# Patient Record
Sex: FEMALE | Race: WHITE | NOT HISPANIC OR LATINO | URBAN - METROPOLITAN AREA
[De-identification: names, ages, dates, MRNs, and addresses within clinical notes are randomized per-mention and may not be internally consistent; named-entity substitution may affect disease eponyms.]

---

## 2019-02-19 ENCOUNTER — HOSPITAL ENCOUNTER (OUTPATIENT)
Dept: URGENT CARE | Facility: CLINIC | Age: 34
Discharge: HOME | End: 2019-02-19

## 2024-10-22 NOTE — PROGRESS NOTES
"Well Woman Visit    CC: Scheduled annual well gyn visit  Chief Complaint   Patient presents with    Annual Exam     Discuss menopause symptoms         S/p LSO and RTO    Last Completed Pap Smear       This patient has no relevant Health Maintenance data.           Last Completed Mammogram       This patient has no relevant Health Maintenance data.             HPI:   39 y.o. s/p endometrial ablation with mostly amenorrhea  PCP: does manage PMHx and preventative labs  History: PMHx, Meds, Allergies, PSHx, Social Hx, and POBHx all reviewed and updated.    Pt has no complaints today. Pt states she has been having significant hot flashes and night sweats for the past year..  She briefly spoke to her last gynecologist a year ago and was told she was too young for menopausal symptoms.  Patient has previously had an endometrial ablation and no longer has any bleeding.  She is also noticing a significant increase in anxiety almost to the point of panic.  She states that symptoms feels like her chest is tight.  She is noticing joint pain and brain fog and difficulty sleeping as well.  She is also having significantly increased decreased libido    PHYSICAL EXAM:  /88   Pulse 69   Ht 165.1 cm (65\")   Wt 73.5 kg (162 lb)   Breastfeeding No   BMI 26.96 kg/m²  Not found.  General- NAD, alert and oriented, appropriate  Psych- Normal mood, good memory  Neck- No masses, no thyroid enlargement  CV- Regular rhythm, no murnurs  Resp- CTA to bases, no wheezes  Abdomen- Soft, non distended, non tender, no masses  Breast left-  Bilaterally symmetrical, no masses, non tender, no nipple discharge  Breast right- Bilaterally symmetrical, no masses, non tender, no nipple discharge  External genitalia- Normal female, no lesions  Urethra/meatus- Normal, no masses, non tender  Bladder- Normal, no masses, non tender  Vagina- Normal, no atrophy, no lesions, no discharge.    Cvx- Normal, no lesions, no discharge, No cervical motion " tenderness  Uterus- Normal size, shape & consistency.  Non tender, mobile.  Adnexa- No mass, non tender  Anus/Rectum/Perineum- Not performed  Lymphatic- No palpable neck, axillary, or groin nodes  Ext- No edema, no cyanosis    Skin- No lesions, no rashes, no acanthosis nigricans      ASSESSMENT and PLAN:    Diagnoses and all orders for this visit:    1. Well woman exam with routine gynecological exam (Primary)  -     IgP, Aptima HPV    2. Perimenopause  Assessment & Plan:  Patient was counseled at length regarding perimenopause as a clinical diagnosis.  Patient was counseled regarding the multiple symptoms that can be secondary to perimenopause.  Her increasing anxiety could be part of perimenopause or it could be worsening anxiety with situational stressors such as work and school and family life.  We reviewed the menopause Society position statement from 2022 regarding the use of MHT.  We discussed the use of OCPs early in the perimenopausal transition for the treatment of symptoms.  However the patient is complaining of decreased libido and is hesitant to try another medication that can decrease libido further.  Patient was counseled regarding cyclic Prometrium or daily Prometrium or transitioning to traditional MHT.    Orders:  -     estradiol (Minivelle) 0.05 MG/24HR patch; Place 1 patch on the skin as directed by provider 2 (Two) Times a Week.  Dispense: 24 patch; Refill: 3  -     Progesterone (Prometrium) 100 MG capsule; Take 1 capsule by mouth Daily.  Dispense: 90 capsule; Refill: 3    3. Anxiety  Comments:  Patient has been on her current dose of Lexapro for several years.  She does not have a PCP and wishes to continue  Orders:  -     escitalopram (LEXAPRO) 10 MG tablet; Take 1 tablet by mouth Daily.  Dispense: 90 tablet; Refill: 3    4. H/O cold sores  Comments:  On prophylactic daily Valtrex  Orders:  -     valACYclovir (VALTREX) 500 MG tablet; Take 1 tablet by mouth Daily.  Dispense: 90 tablet; Refill:  3    5. Encounter for screening mammogram for malignant neoplasm of breast  -     Mammo Screening Digital Tomosynthesis Bilateral With CAD; Future        Preventative:  BREAST HEALTH- Monthly self breast exam importance and how to reviewed. MMG and/or MRI (prn) reviewed per society guidelines and her individual history. Screen: Pt will call to schedule  CERVICAL CANCER Screening- Reviewed current ASCCP guidelines for screening w and wo cotest HPV, age specific.  Screen: Updated today  Follow up PCP/Specialist PMHx and/or Labs    HRT/ERT- newer (as of 2024) evidence, along with an in-depth review of [facility] results, supports the use of iso-molecular (identical to human) estradiol for treatment of vasomotor symptoms and osteoporosis prevention without increasing risk of cardiovascular disease.  Starting within 10years of menopause and before 59yo is ideal. For patients who have not had a hysterectomy, the addition of iso-molecular micronized progesterone further enhances bone mineral density without an increase in breast cancer incidence or mortality.  Atherosclerotic cardiovascular disease (ASCVD) risk calculated today not calculated today.  Typically if ASCVD risk is 19% or more, HRT is not recommended.   She understands the importance of having any ordered tests to be performed in a timely fashion.  The risks of not performing them include, but are not limited to, advanced cancer stages, bone loss from osteoporosis and/or subsequent increase in morbidity and/or mortality.  She is encouraged to review her results online and/or contact or office if she has questions.     Follow Up:  Return in about 3 months (around 1/23/2025).    I spent 45 minutes on the separately reported service of perimenopause. This time is not included in the time used to support the E/M service also reported today.        Faye Albrecht MD  10/23/2024    [facility]  [address] [place]  [place] [address]  Dept: [phone]  Dept Fax: [phone]  Loc: [phone]  Loc Fax: [phone]

## 2024-10-23 ENCOUNTER — OFFICE VISIT (OUTPATIENT)
Dept: OBSTETRICS AND GYNECOLOGY | Facility: CLINIC | Age: 39
End: 2024-10-23
Payer: COMMERCIAL

## 2024-10-23 VITALS
DIASTOLIC BLOOD PRESSURE: 88 MMHG | SYSTOLIC BLOOD PRESSURE: 122 MMHG | HEIGHT: 65 IN | HEART RATE: 69 BPM | BODY MASS INDEX: 26.99 KG/M2 | WEIGHT: 162 LBS

## 2024-10-23 DIAGNOSIS — Z86.19 H/O COLD SORES: ICD-10-CM

## 2024-10-23 DIAGNOSIS — Z12.31 ENCOUNTER FOR SCREENING MAMMOGRAM FOR MALIGNANT NEOPLASM OF BREAST: ICD-10-CM

## 2024-10-23 DIAGNOSIS — N95.1 PERIMENOPAUSE: ICD-10-CM

## 2024-10-23 DIAGNOSIS — Z01.419 WELL WOMAN EXAM WITH ROUTINE GYNECOLOGICAL EXAM: Primary | ICD-10-CM

## 2024-10-23 DIAGNOSIS — F41.9 ANXIETY: ICD-10-CM

## 2024-10-23 PROCEDURE — 87624 HPV HI-RISK TYP POOLED RSLT: CPT | Performed by: OBSTETRICS & GYNECOLOGY

## 2024-10-23 PROCEDURE — G0123 SCREEN CERV/VAG THIN LAYER: HCPCS | Performed by: OBSTETRICS & GYNECOLOGY

## 2024-10-23 RX ORDER — ESTRADIOL 0.05 MG/D
1 PATCH, EXTENDED RELEASE TRANSDERMAL 2 TIMES WEEKLY
Qty: 24 PATCH | Refills: 3 | Status: SHIPPED | OUTPATIENT
Start: 2024-10-24

## 2024-10-23 RX ORDER — ESCITALOPRAM OXALATE 10 MG/1
10 TABLET ORAL DAILY
COMMUNITY
End: 2024-10-23 | Stop reason: SDUPTHER

## 2024-10-23 RX ORDER — FEXOFENADINE HCL 180 MG/1
180 TABLET ORAL DAILY
COMMUNITY

## 2024-10-23 RX ORDER — ESCITALOPRAM OXALATE 10 MG/1
10 TABLET ORAL DAILY
Qty: 90 TABLET | Refills: 3 | Status: SHIPPED | OUTPATIENT
Start: 2024-10-23 | End: 2024-10-25 | Stop reason: DRUGHIGH

## 2024-10-23 RX ORDER — VALACYCLOVIR HYDROCHLORIDE 500 MG/1
500 TABLET, FILM COATED ORAL DAILY
Qty: 90 TABLET | Refills: 3 | Status: SHIPPED | OUTPATIENT
Start: 2024-10-23

## 2024-10-23 RX ORDER — PROGESTERONE 100 MG/1
100 CAPSULE ORAL DAILY
Qty: 90 CAPSULE | Refills: 3 | Status: SHIPPED | OUTPATIENT
Start: 2024-10-23

## 2024-10-23 RX ORDER — VALACYCLOVIR HYDROCHLORIDE 500 MG/1
500 TABLET, FILM COATED ORAL 2 TIMES DAILY
COMMUNITY
End: 2024-10-23 | Stop reason: SDUPTHER

## 2024-10-23 NOTE — PATIENT INSTRUCTIONS
You are Not Broken by Luisa Swartz MD  The New Menopause by Erica Nowak MD  Come as You Are by Ngozi Yan, PhD    [employer] personal lubricant       The Menopause Society Position Statement 2022 Position Statement on MHT

## 2024-10-24 PROBLEM — N95.1 PERIMENOPAUSE: Status: ACTIVE | Noted: 2024-10-24

## 2024-10-24 NOTE — ASSESSMENT & PLAN NOTE
Patient was counseled at length regarding perimenopause as a clinical diagnosis.  Patient was counseled regarding the multiple symptoms that can be secondary to perimenopause.  Her increasing anxiety could be part of perimenopause or it could be worsening anxiety with situational stressors such as work and school and family life.  We reviewed the menopause Society position statement from 2022 regarding the use of MHT.  We discussed the use of OCPs early in the perimenopausal transition for the treatment of symptoms.  However the patient is complaining of decreased libido and is hesitant to try another medication that can decrease libido further.  Patient was counseled regarding cyclic Prometrium or daily Prometrium or transitioning to traditional MHT.

## 2024-10-29 LAB
CYTOLOGIST CVX/VAG CYTO: NORMAL
CYTOLOGY CVX/VAG DOC CYTO: NORMAL
CYTOLOGY CVX/VAG DOC THIN PREP: NORMAL
DX ICD CODE: NORMAL
HPV I/H RISK 4 DNA CVX QL PROBE+SIG AMP: NEGATIVE
Lab: NORMAL
OTHER STN SPEC: NORMAL
STAT OF ADQ CVX/VAG CYTO-IMP: NORMAL

## 2025-02-03 NOTE — PROGRESS NOTES
"GYN Visit    CC: Perimenopause    HPI:   39 y.o. Contraception or HRT: using HRTs/p LSO and RTO    History of Present Illness  The patient presents for evaluation of perimenopause.    She was initiated on estrogen and progesterone therapy for perimenopausal symptoms, which significantly improved her condition within the first few weeks. She reported a cessation of night sweats, reduced anxiety, increased energy levels, and an overall improvement in mood. However, over the past 3 weeks, she has experienced a resurgence of night sweats and a decline in energy levels. She reports no new life stressors or changes in her routine. The night sweats are not constant but have increased in frequency. She is uncertain about the use of a generic patch but notes that her symptoms tend to worsen the night before she changes her patch. She prefers the convenience of the patch and is curious about the implications of increasing the dosage. Her sleep quality has improved, and she has not attempted to take progesterone during the day. She is diligent about taking her medication before bed and notices a difference if she forgets. Her  and children have observed positive changes in her demeanor. She has lost approximately 5 pounds and no longer experiences bloating. She feels ready to resume gym activities. Her appetite has normalized, and she no longer craves chocolate. She reports no issues with painful intercourse or libido. She has noticed some spotting, which is not severe. She can predict her menstrual cycle based on changes in her bowel movements. She underwent an annual exam in October.    MEDICATIONS  Current: Estrogen, progesterone    History: PMHx, Meds, Allergies, PSHx, Social Hx, and POBHx all reviewed and updated.    PHYSICAL EXAM:  /71   Pulse 73   Ht 165.1 cm (65\")   Wt 71.2 kg (157 lb)   Breastfeeding No   BMI 26.13 kg/m²   General- NAD, alert and oriented, appropriate  Psych- Normal mood, good " memory      ASSESSMENT AND PLAN:  Diagnoses and all orders for this visit:    1. Perimenopause (Primary)  -     estradiol (Minivelle) 0.075 MG/24HR patch; Place 1 patch on the skin as directed by provider 2 (Two) Times a Week.  Dispense: 24 patch; Refill: 3        Assessment & Plan  1. Perimenopause.  She reports initial improvement in symptoms such as night sweats, anxiety, and energy levels after starting estrogen and progesterone therapy. However, over the past 3 weeks, night sweats and decreased energy levels have returned. She is currently using a 0.05 mg estrogen patch. The dosage of the estrogen patch will be increased to 0.075 mg. She is advised to cut the current patches in half and use 1.5 patches until the current supply is exhausted. The progesterone dosage will remain unchanged. She is also advised that spotting should resolve after the first 6 months of treatment. If symptoms persist, further adjustments will be considered.    Follow-up  The patient will follow up in October 2025.         Follow Up:  Return for Annual physical.          Faye Albrecht MD  02/04/2025    Patient or patient representative verbalized consent for the use of Ambient Listening during the visit with  Faye Albrecht MD for chart documentation. 2/4/2025  12:15 EST

## 2025-02-04 ENCOUNTER — OFFICE VISIT (OUTPATIENT)
Dept: OBSTETRICS AND GYNECOLOGY | Facility: CLINIC | Age: 40
End: 2025-02-04
Payer: COMMERCIAL

## 2025-02-04 VITALS
DIASTOLIC BLOOD PRESSURE: 71 MMHG | HEIGHT: 65 IN | SYSTOLIC BLOOD PRESSURE: 104 MMHG | HEART RATE: 73 BPM | BODY MASS INDEX: 26.16 KG/M2 | WEIGHT: 157 LBS

## 2025-02-04 DIAGNOSIS — N95.1 PERIMENOPAUSE: Primary | ICD-10-CM

## 2025-02-04 RX ORDER — ESTRADIOL 0.07 MG/D
1 FILM, EXTENDED RELEASE TRANSDERMAL 2 TIMES WEEKLY
Qty: 24 PATCH | Refills: 3 | Status: SHIPPED | OUTPATIENT
Start: 2025-02-06

## 2025-02-08 PROCEDURE — 87086 URINE CULTURE/COLONY COUNT: CPT | Performed by: STUDENT IN AN ORGANIZED HEALTH CARE EDUCATION/TRAINING PROGRAM

## 2025-02-19 ENCOUNTER — HOSPITAL ENCOUNTER (OUTPATIENT)
Dept: MAMMOGRAPHY | Facility: HOSPITAL | Age: 40
Discharge: HOME | End: 2025-02-19
Admitting: OBSTETRICS & GYNECOLOGY
Payer: COMMERCIAL

## 2025-02-19 DIAGNOSIS — Z12.31 ENCOUNTER FOR SCREENING MAMMOGRAM FOR MALIGNANT NEOPLASM OF BREAST: ICD-10-CM

## 2025-02-19 PROCEDURE — 77063 BREAST TOMOSYNTHESIS BI: CPT

## 2025-02-19 PROCEDURE — 77067 SCR MAMMO BI INCL CAD: CPT

## 2025-07-17 DIAGNOSIS — N95.1 PERIMENOPAUSE: Primary | ICD-10-CM

## 2025-07-17 RX ORDER — ESTRADIOL 1 MG/1
1 TABLET ORAL DAILY
Qty: 90 TABLET | Refills: 3 | Status: SHIPPED | OUTPATIENT
Start: 2025-07-17